# Patient Record
Sex: FEMALE | Race: WHITE | NOT HISPANIC OR LATINO | URBAN - METROPOLITAN AREA
[De-identification: names, ages, dates, MRNs, and addresses within clinical notes are randomized per-mention and may not be internally consistent; named-entity substitution may affect disease eponyms.]

---

## 2024-01-12 ENCOUNTER — OFFICE VISIT (OUTPATIENT)
Dept: PRIMARY CARE | Facility: CLINIC | Age: 19
End: 2024-01-12
Payer: COMMERCIAL

## 2024-01-12 VITALS
WEIGHT: 146 LBS | BODY MASS INDEX: 20.9 KG/M2 | SYSTOLIC BLOOD PRESSURE: 92 MMHG | TEMPERATURE: 97.4 F | DIASTOLIC BLOOD PRESSURE: 58 MMHG | OXYGEN SATURATION: 98 % | HEIGHT: 70 IN

## 2024-01-12 DIAGNOSIS — Z76.89 ENCOUNTER TO ESTABLISH CARE: Primary | ICD-10-CM

## 2024-01-12 DIAGNOSIS — H69.91 EUSTACHIAN TUBE DYSFUNCTION, RIGHT: ICD-10-CM

## 2024-01-12 DIAGNOSIS — R09.81 SINUS CONGESTION: ICD-10-CM

## 2024-01-12 DIAGNOSIS — Z82.49 FAMILY HISTORY OF HEART DISEASE: ICD-10-CM

## 2024-01-12 DIAGNOSIS — Z00.00 HEALTHCARE MAINTENANCE: ICD-10-CM

## 2024-01-12 DIAGNOSIS — Z80.3 FAMILY HISTORY OF BREAST CANCER: ICD-10-CM

## 2024-01-12 PROCEDURE — 1036F TOBACCO NON-USER: CPT | Performed by: STUDENT IN AN ORGANIZED HEALTH CARE EDUCATION/TRAINING PROGRAM

## 2024-01-12 PROCEDURE — 99204 OFFICE O/P NEW MOD 45 MIN: CPT | Performed by: STUDENT IN AN ORGANIZED HEALTH CARE EDUCATION/TRAINING PROGRAM

## 2024-01-12 RX ORDER — FLUTICASONE PROPIONATE 50 MCG
2 SPRAY, SUSPENSION (ML) NASAL DAILY
Qty: 16 G | Refills: 1 | Status: SHIPPED | OUTPATIENT
Start: 2024-01-12 | End: 2025-01-11

## 2024-01-12 ASSESSMENT — ENCOUNTER SYMPTOMS
NAUSEA: 0
COUGH: 0
ARTHRALGIAS: 0
LIGHT-HEADEDNESS: 0
NUMBNESS: 0
CONSTIPATION: 0
VOMITING: 0
LOSS OF SENSATION IN FEET: 0
ABDOMINAL PAIN: 0
DIARRHEA: 0
MYALGIAS: 0
CHILLS: 0
EYE ITCHING: 0
SINUS PRESSURE: 0
PHOTOPHOBIA: 0
SHORTNESS OF BREATH: 0
FEVER: 0
DEPRESSION: 0
DIZZINESS: 0
EYE DISCHARGE: 0
RHINORRHEA: 0
FATIGUE: 0
EYE REDNESS: 1
SINUS PAIN: 0
EYE PAIN: 0
OCCASIONAL FEELINGS OF UNSTEADINESS: 0

## 2024-01-12 ASSESSMENT — COLUMBIA-SUICIDE SEVERITY RATING SCALE - C-SSRS
6. HAVE YOU EVER DONE ANYTHING, STARTED TO DO ANYTHING, OR PREPARED TO DO ANYTHING TO END YOUR LIFE?: NO
2. HAVE YOU ACTUALLY HAD ANY THOUGHTS OF KILLING YOURSELF?: NO
1. IN THE PAST MONTH, HAVE YOU WISHED YOU WERE DEAD OR WISHED YOU COULD GO TO SLEEP AND NOT WAKE UP?: NO

## 2024-01-12 ASSESSMENT — PATIENT HEALTH QUESTIONNAIRE - PHQ9
SUM OF ALL RESPONSES TO PHQ9 QUESTIONS 1 AND 2: 0
1. LITTLE INTEREST OR PLEASURE IN DOING THINGS: NOT AT ALL
10. IF YOU CHECKED OFF ANY PROBLEMS, HOW DIFFICULT HAVE THESE PROBLEMS MADE IT FOR YOU TO DO YOUR WORK, TAKE CARE OF THINGS AT HOME, OR GET ALONG WITH OTHER PEOPLE: NOT DIFFICULT AT ALL
2. FEELING DOWN, DEPRESSED OR HOPELESS: NOT AT ALL

## 2024-01-12 NOTE — PATIENT INSTRUCTIONS
Thank you for coming into getting established today.    I think that you are still having some postviral postnasal drip that is just causing some irritation.  I did send in for nasal spray.  It probably will be cheaper over-the-counter but you can look at what it is called sent you  the right 1.  You could also take some allergy medication like Zyrtec as we discussed.  If your nose does get dry, I would recommend using regular nasal saline to keep it moist.  You can use a humidifier at night as well.    I did go ahead and order lab work.  Please be fasting for the labs at least 10 to 12 hours beforehand.  Lab is located downstairs on the first floor they are open on weekdays only from about 6:30 AM to about 4:30 PM.  Please be fasting for the lab work but make sure that you stay hydrated.    We can follow-up with you in about a month for a wellness examination.  Please get scheduled for this before you leave here today.    Please call with any other questions or concerns.    Thank you

## 2024-01-12 NOTE — PROGRESS NOTES
"Subjective   Patient ID: Ana Rosa Pepper is a 19 y.o. female who presents for Establish Care.    HPI     She is here to establish care.  It has been some time since she has last received care.  No recent lab work.  She is following with Ob/Gyn. She has an upcoming appointment. She is on birth control from them. She is not sure what she is taking.    Her paternal uncle  of a heart condition in his 30s.  Her father has a heart disease but she does not know what it is. He had a heart procedure a few years ago in his 50s.  Paternal grandmother had breast cancer in her 60s.    She has noticed a few weeks ago.  Her left eye she woke up and it had some swelling and redness. No pain or discharge.  She bought some eye drops which seemed to help.  But still wakes up sometimes with redness. No more swelling or pain or discharge.  Overall it has been getting better but has not fully gone away.    She had a bit of a cold a few weeks ago while her left eye was acting up.  Now her right throat seems to be a little sore that is also lingering. No worsening. No fevers or throat drainage.  Now she has also been having some increased pressure in the right ear. No significant pain. Notices with swallowing.      Review of Systems   Constitutional:  Negative for chills, fatigue and fever.   HENT:  Positive for postnasal drip. Negative for congestion, rhinorrhea, sinus pressure and sinus pain.    Eyes:  Positive for redness. Negative for photophobia, pain, discharge, itching and visual disturbance.   Respiratory:  Negative for cough and shortness of breath.    Cardiovascular:  Negative for chest pain.   Gastrointestinal:  Negative for abdominal pain, constipation, diarrhea, nausea and vomiting.   Musculoskeletal:  Negative for arthralgias and myalgias.   Neurological:  Negative for dizziness, light-headedness and numbness.       Objective   BP 92/58   Temp 36.3 °C (97.4 °F)   Ht 1.778 m (5' 10\")   Wt 66.2 kg (146 lb)   SpO2 98%   " BMI 20.95 kg/m²     Physical Exam  Vitals and nursing note reviewed.   Constitutional:       General: She is not in acute distress.     Appearance: Normal appearance. She is normal weight. She is not ill-appearing or toxic-appearing.   HENT:      Head: Normocephalic and atraumatic.      Right Ear: Tympanic membrane, ear canal and external ear normal.      Left Ear: Tympanic membrane, ear canal and external ear normal.      Nose: Congestion present. No rhinorrhea.      Mouth/Throat:      Mouth: Mucous membranes are moist.      Pharynx: No oropharyngeal exudate.   Eyes:      Extraocular Movements: Extraocular movements intact.      Conjunctiva/sclera: Conjunctivae normal.      Pupils: Pupils are equal, round, and reactive to light.   Cardiovascular:      Rate and Rhythm: Normal rate and regular rhythm.      Heart sounds: Normal heart sounds.   Pulmonary:      Effort: Pulmonary effort is normal.      Breath sounds: Normal breath sounds.   Neurological:      Mental Status: She is alert.         Assessment/Plan   Problem List Items Addressed This Visit             ICD-10-CM    Family history of breast cancer Z80.3     Paternal grandmother had breast cancer in her 60s.         Family history of heart disease Z82.49     Her paternal uncle  of a heart condition in his 30s.  Her father has a heart disease but she does not know what it is. He had a heart procedure a few years ago in his 50s.          Other Visit Diagnoses         Codes    Encounter to establish care    -  Primary Z76.89    Healthcare maintenance     Z00.00    Relevant Orders    CBC and Auto Differential    Comprehensive Metabolic Panel    Lipid Panel    TSH with reflex to Free T4 if abnormal    Urinalysis with Reflex Microscopic    Vitamin D 25-Hydroxy,Total (for eval of Vitamin D levels)    Eustachian tube dysfunction, right     H69.91    Relevant Medications    fluticasone (Flonase) 50 mcg/actuation nasal spray    Sinus congestion     R09.81    Relevant  Medications    fluticasone (Flonase) 50 mcg/actuation nasal spray          History and physical examination as above.  Patient presenting overall to [facility] care.  Overall patient is doing well.  She does have some recent concerns but is overall been improving including her eye as well as some other upper respiratory symptoms.  Based on her description, likely eustachian tube dysfunction on the right side.  Discussed over-the-counter medications with fluticasone propionate nasal spray.  Discussed other over-the-counter medications that she can use to help with treating her symptoms.  Discussed that if her symptoms worsen, we can consider additional treatment.  Updated some of the patient's family history in her chart.  No recent lab work.  Lab work ordered to be done fasting for the patient.  Patient will follow-up in about a month for a wellness examination.  She will continue to follow regularly with her OB/GYN.  She will come in sooner for other acute concerns or complaints.

## 2024-01-24 PROBLEM — Z82.49 FAMILY HISTORY OF HEART DISEASE: Status: ACTIVE | Noted: 2024-01-24

## 2024-01-24 PROBLEM — Z80.3 FAMILY HISTORY OF BREAST CANCER: Status: ACTIVE | Noted: 2024-01-24

## 2024-01-24 NOTE — ASSESSMENT & PLAN NOTE
Her paternal uncle  of a heart condition in his 30s.  Her father has a heart disease but she does not know what it is. He had a heart procedure a few years ago in his 50s.

## 2024-02-22 ENCOUNTER — APPOINTMENT (OUTPATIENT)
Dept: PRIMARY CARE | Facility: CLINIC | Age: 19
End: 2024-02-22
Payer: COMMERCIAL

## 2024-03-01 ENCOUNTER — APPOINTMENT (OUTPATIENT)
Dept: PRIMARY CARE | Facility: CLINIC | Age: 19
End: 2024-03-01
Payer: COMMERCIAL

## 2024-07-01 ENCOUNTER — TELEPHONE (OUTPATIENT)
Dept: PRIMARY CARE | Facility: CLINIC | Age: 19
End: 2024-07-01
Payer: COMMERCIAL

## 2024-09-17 ENCOUNTER — APPOINTMENT (OUTPATIENT)
Dept: PRIMARY CARE | Facility: CLINIC | Age: 19
End: 2024-09-17
Payer: COMMERCIAL

## 2024-12-20 ENCOUNTER — APPOINTMENT (OUTPATIENT)
Dept: PRIMARY CARE | Facility: CLINIC | Age: 19
End: 2024-12-20
Payer: COMMERCIAL

## 2024-12-20 VITALS
DIASTOLIC BLOOD PRESSURE: 63 MMHG | WEIGHT: 156 LBS | SYSTOLIC BLOOD PRESSURE: 95 MMHG | HEART RATE: 73 BPM | HEIGHT: 69 IN | TEMPERATURE: 98.3 F | OXYGEN SATURATION: 97 % | BODY MASS INDEX: 23.11 KG/M2

## 2024-12-20 DIAGNOSIS — Z80.3 FAMILY HISTORY OF BREAST CANCER: ICD-10-CM

## 2024-12-20 DIAGNOSIS — Z00.00 ENCOUNTER FOR ADULT WELLNESS VISIT: Primary | ICD-10-CM

## 2024-12-20 PROCEDURE — 3008F BODY MASS INDEX DOCD: CPT | Performed by: STUDENT IN AN ORGANIZED HEALTH CARE EDUCATION/TRAINING PROGRAM

## 2024-12-20 PROCEDURE — 99395 PREV VISIT EST AGE 18-39: CPT | Performed by: STUDENT IN AN ORGANIZED HEALTH CARE EDUCATION/TRAINING PROGRAM

## 2024-12-20 RX ORDER — DROSPIRENONE AND ETHINYL ESTRADIOL 0.02-3(28)
1 KIT ORAL DAILY
COMMUNITY

## 2024-12-20 ASSESSMENT — ENCOUNTER SYMPTOMS
MYALGIAS: 0
COLOR CHANGE: 0
DIZZINESS: 0
CONSTIPATION: 0
DYSURIA: 0
DYSPHORIC MOOD: 0
DIARRHEA: 0
LIGHT-HEADEDNESS: 0
COUGH: 0
NAUSEA: 0
FATIGUE: 0
NERVOUS/ANXIOUS: 0
VOMITING: 0
RHINORRHEA: 0
FREQUENCY: 0
SHORTNESS OF BREATH: 0
CHILLS: 0
PALPITATIONS: 0
ABDOMINAL PAIN: 0
ARTHRALGIAS: 0
FEVER: 0
NUMBNESS: 0

## 2024-12-20 ASSESSMENT — PATIENT HEALTH QUESTIONNAIRE - PHQ9
SUM OF ALL RESPONSES TO PHQ9 QUESTIONS 1 AND 2: 0
2. FEELING DOWN, DEPRESSED OR HOPELESS: NOT AT ALL
1. LITTLE INTEREST OR PLEASURE IN DOING THINGS: NOT AT ALL

## 2024-12-20 NOTE — PROGRESS NOTES
"Subjective   Patient ID: Ana Rosa Pepper is a 19 y.o. female who presents for Annual Exam.    HPI     No acute concerns or complaints today.    Dental: Regular dental exams twice yearly.  Vision: No vision correction. No recent eye exams. No changes in vision.    Diet: Well balanced and healthy.  Exercise: Not as much as she would like. Walking, used to go to the gym.  Caffeine: Coffee. Sugar cookie latte in the morning.  Fluids: Well hydrated  Supplements: None    Tobacco: None. Never.  Alcohol: 4 drinks a month average.  Recreational Drugs: None.    Last Colonoscopy: No family history of colon cancer.  Last Pap smear: Follows with [facility].  Mammogram: Paternal grandmother in her 60s.  Low Dose Lung CT Screening: Not indicated  AAA Screening: Not indicated    Influenza: Recommended annually.  Tetanus: 2015  COVID: Recommended updated vaccine.    Review of Systems   Constitutional:  Negative for chills, fatigue and fever.   HENT:  Negative for congestion and rhinorrhea.    Eyes:  Negative for visual disturbance.   Respiratory:  Negative for cough and shortness of breath.    Cardiovascular:  Negative for chest pain and palpitations.   Gastrointestinal:  Negative for abdominal pain, constipation, diarrhea, nausea and vomiting.   Genitourinary:  Negative for dysuria and frequency.   Musculoskeletal:  Negative for arthralgias and myalgias.   Skin:  Negative for color change and rash.   Neurological:  Negative for dizziness, light-headedness and numbness.   Psychiatric/Behavioral:  Negative for dysphoric mood. The patient is not nervous/anxious.        Objective   BP 95/63   Pulse 73   Temp 36.8 °C (98.3 °F)   Ht 1.759 m (5' 9.25\")   Wt 70.8 kg (156 lb)   SpO2 97%   BMI 22.87 kg/m²   BSA Body surface area is 1.86 meters squared.      Physical Exam  Vitals and nursing note reviewed.   Constitutional:       General: She is not in acute distress.     Appearance: Normal appearance. She is normal weight. She is " not ill-appearing or toxic-appearing.   HENT:      Head: Normocephalic and atraumatic.      Right Ear: Tympanic membrane, ear canal and external ear normal.      Left Ear: Tympanic membrane, ear canal and external ear normal.      Nose: Nose normal.      Mouth/Throat:      Mouth: Mucous membranes are moist.   Eyes:      Extraocular Movements: Extraocular movements intact.      Conjunctiva/sclera: Conjunctivae normal.      Pupils: Pupils are equal, round, and reactive to light.   Cardiovascular:      Rate and Rhythm: Normal rate and regular rhythm.      Pulses: Normal pulses.      Heart sounds: Normal heart sounds.   Pulmonary:      Effort: Pulmonary effort is normal.      Breath sounds: Normal breath sounds.   Abdominal:      General: Abdomen is flat. Bowel sounds are normal.      Palpations: Abdomen is soft.   Musculoskeletal:         General: Normal range of motion.      Cervical back: Normal range of motion and neck supple.   Skin:     General: Skin is warm.   Neurological:      General: No focal deficit present.      Mental Status: She is alert and oriented to person, place, and time. Mental status is at baseline.      Cranial Nerves: No cranial nerve deficit.      Sensory: No sensory deficit.   Psychiatric:         Mood and Affect: Mood normal.         Behavior: Behavior normal.         Thought Content: Thought content normal.         Judgment: Judgment normal.       No results found for any previous visit.     Current Outpatient Medications on File Prior to Visit   Medication Sig Dispense Refill    fluticasone (Flonase) 50 mcg/actuation nasal spray Administer 2 sprays into each nostril once daily. Shake gently. Before first use, prime pump. After use, clean tip and replace cap. 16 g 1    Vestura, 28, 3-0.02 mg tablet Take 1 tablet by mouth once daily.       No current facility-administered medications on file prior to visit.     No images are attached to the encounter.        Assessment/Plan   Problem List  Items Addressed This Visit             ICD-10-CM    Family history of breast cancer Z80.3     Paternal grandmother had breast cancer in her 60s.         Family history of heart disease Z82.49     Other Visit Diagnoses         Codes    Encounter for adult wellness visit    -  Primary Z00.00          History and physical examination as above.  Patient coming in for wellness examination.  No acute concerns or complaints today.  Discussed multiple aspects of health including healthy diet and exercise, regular dental and vision care, caffeine use, hydration, supplementation, substance use, screening test and immunizations.  Lab work orders in place for the patient.  She will continue with regular follow-up with her OB/GYN.  Plan for continuing with yearly wellness care.  She will come in sooner with other acute concerns or complaints.

## 2024-12-23 ENCOUNTER — LAB (OUTPATIENT)
Dept: LAB | Facility: LAB | Age: 19
End: 2024-12-23
Payer: COMMERCIAL

## 2024-12-23 DIAGNOSIS — Z00.00 HEALTHCARE MAINTENANCE: ICD-10-CM

## 2024-12-23 LAB
25(OH)D3 SERPL-MCNC: 40 NG/ML (ref 30–100)
ALBUMIN SERPL BCP-MCNC: 4.2 G/DL (ref 3.4–5)
ALP SERPL-CCNC: 46 U/L (ref 33–110)
ALT SERPL W P-5'-P-CCNC: 13 U/L (ref 7–45)
ANION GAP SERPL CALC-SCNC: 13 MMOL/L (ref 10–20)
AST SERPL W P-5'-P-CCNC: 15 U/L (ref 9–39)
BASOPHILS # BLD AUTO: 0.05 X10*3/UL (ref 0–0.1)
BASOPHILS NFR BLD AUTO: 1 %
BILIRUB SERPL-MCNC: 0.3 MG/DL (ref 0–1.2)
BUN SERPL-MCNC: 11 MG/DL (ref 6–23)
CALCIUM SERPL-MCNC: 9.6 MG/DL (ref 8.6–10.6)
CHLORIDE SERPL-SCNC: 106 MMOL/L (ref 98–107)
CHOLEST SERPL-MCNC: 210 MG/DL (ref 0–199)
CHOLESTEROL/HDL RATIO: 2.4
CO2 SERPL-SCNC: 26 MMOL/L (ref 21–32)
CREAT SERPL-MCNC: 0.67 MG/DL (ref 0.5–1.05)
EGFRCR SERPLBLD CKD-EPI 2021: >90 ML/MIN/1.73M*2
EOSINOPHIL # BLD AUTO: 0.08 X10*3/UL (ref 0–0.7)
EOSINOPHIL NFR BLD AUTO: 1.7 %
ERYTHROCYTE [DISTWIDTH] IN BLOOD BY AUTOMATED COUNT: 13.6 % (ref 11.5–14.5)
GLUCOSE SERPL-MCNC: 93 MG/DL (ref 74–99)
HCT VFR BLD AUTO: 40.4 % (ref 36–46)
HDLC SERPL-MCNC: 87.8 MG/DL
HGB BLD-MCNC: 12.7 G/DL (ref 12–16)
IMM GRANULOCYTES # BLD AUTO: 0.02 X10*3/UL (ref 0–0.7)
IMM GRANULOCYTES NFR BLD AUTO: 0.4 % (ref 0–0.9)
LDLC SERPL CALC-MCNC: 109 MG/DL
LYMPHOCYTES # BLD AUTO: 1.76 X10*3/UL (ref 1.2–4.8)
LYMPHOCYTES NFR BLD AUTO: 36.5 %
MCH RBC QN AUTO: 26.9 PG (ref 26–34)
MCHC RBC AUTO-ENTMCNC: 31.4 G/DL (ref 32–36)
MCV RBC AUTO: 86 FL (ref 80–100)
MONOCYTES # BLD AUTO: 0.32 X10*3/UL (ref 0.1–1)
MONOCYTES NFR BLD AUTO: 6.6 %
NEUTROPHILS # BLD AUTO: 2.59 X10*3/UL (ref 1.2–7.7)
NEUTROPHILS NFR BLD AUTO: 53.8 %
NON HDL CHOLESTEROL: 122 MG/DL (ref 0–149)
NRBC BLD-RTO: 0 /100 WBCS (ref 0–0)
PLATELET # BLD AUTO: 238 X10*3/UL (ref 150–450)
POTASSIUM SERPL-SCNC: 4 MMOL/L (ref 3.5–5.3)
PROT SERPL-MCNC: 6.9 G/DL (ref 6.4–8.2)
RBC # BLD AUTO: 4.72 X10*6/UL (ref 4–5.2)
SODIUM SERPL-SCNC: 141 MMOL/L (ref 136–145)
TRIGL SERPL-MCNC: 67 MG/DL (ref 0–114)
TSH SERPL-ACNC: 0.55 MIU/L (ref 0.44–3.98)
VLDL: 13 MG/DL (ref 0–40)
WBC # BLD AUTO: 4.8 X10*3/UL (ref 4.4–11.3)

## 2024-12-23 PROCEDURE — 84443 ASSAY THYROID STIM HORMONE: CPT

## 2024-12-23 PROCEDURE — 82306 VITAMIN D 25 HYDROXY: CPT

## 2024-12-23 PROCEDURE — 80053 COMPREHEN METABOLIC PANEL: CPT

## 2024-12-23 PROCEDURE — 85025 COMPLETE CBC W/AUTO DIFF WBC: CPT

## 2024-12-23 PROCEDURE — 80061 LIPID PANEL: CPT
